# Patient Record
Sex: MALE
[De-identification: names, ages, dates, MRNs, and addresses within clinical notes are randomized per-mention and may not be internally consistent; named-entity substitution may affect disease eponyms.]

---

## 2019-08-06 PROBLEM — Z00.00 ENCOUNTER FOR PREVENTIVE HEALTH EXAMINATION: Status: ACTIVE | Noted: 2019-08-06

## 2019-08-09 ENCOUNTER — APPOINTMENT (OUTPATIENT)
Dept: ORTHOPEDIC SURGERY | Facility: CLINIC | Age: 64
End: 2019-08-09
Payer: COMMERCIAL

## 2019-08-09 VITALS
SYSTOLIC BLOOD PRESSURE: 100 MMHG | HEART RATE: 63 BPM | WEIGHT: 190 LBS | BODY MASS INDEX: 28.14 KG/M2 | OXYGEN SATURATION: 98 % | HEIGHT: 69 IN | DIASTOLIC BLOOD PRESSURE: 60 MMHG

## 2019-08-09 DIAGNOSIS — J44.9 CHRONIC OBSTRUCTIVE PULMONARY DISEASE, UNSPECIFIED: ICD-10-CM

## 2019-08-09 DIAGNOSIS — Z78.9 OTHER SPECIFIED HEALTH STATUS: ICD-10-CM

## 2019-08-09 DIAGNOSIS — Z86.69 PERSONAL HISTORY OF OTHER DISEASES OF THE NERVOUS SYSTEM AND SENSE ORGANS: ICD-10-CM

## 2019-08-09 DIAGNOSIS — M17.11 UNILATERAL PRIMARY OSTEOARTHRITIS, RIGHT KNEE: ICD-10-CM

## 2019-08-09 DIAGNOSIS — I10 ESSENTIAL (PRIMARY) HYPERTENSION: ICD-10-CM

## 2019-08-09 PROCEDURE — 99203 OFFICE O/P NEW LOW 30 MIN: CPT

## 2019-08-09 RX ORDER — LOSARTAN POTASSIUM 100 MG/1
TABLET, FILM COATED ORAL
Refills: 0 | Status: ACTIVE | COMMUNITY

## 2019-08-09 RX ORDER — MONTELUKAST SODIUM 10 MG/1
10 TABLET, FILM COATED ORAL
Refills: 0 | Status: ACTIVE | COMMUNITY

## 2019-08-19 NOTE — PROCEDURE
[de-identified] : Under strict sterile technique, the right knee was prepped with Betadine. Using the superolateral approach, with the patient supine, 1ml of Kenalog was mixed with 5mls of 1% Lidocaine and 5mLs of 0.5% Marcaine, and was injected intraarticularly. The patient tolerated the procedure well. The patient was instructed to avoid vigorous exercise for 24 hours and will apply ice to the knee for 20 minutes 2-3 times per day if discomfort occurs. Patient will return on an as needed basis. The patient will call if any questions or problems should arise

## 2019-08-19 NOTE — HISTORY OF PRESENT ILLNESS
[de-identified] : Mr Mudigonda is a 65 yo gentleman with right knee issues since Monday. he was carrying his bike down a flight of stairs and felt a click in the back on his knee. He could not bear weight. His knee swelled. he has been icing and taking advil with some improvement. He continues to use a cane for support. His knee feels stiff and weak. he denies any previous knee issues. He was sent for an MRI and presents now for further evaluation.

## 2019-08-19 NOTE — END OF VISIT
[FreeTextEntry3] : \par All medical record entries made by CARYL Roca, acting as a scribe for this encounter under the direction of Romulo Andersen MD . I have reviewed the chart and agree that the record accurately reflects my personal performance of the history, physical exam, assessment and plan. I have also personally directed, reviewed, and agreed with the chart. \par \par

## 2019-08-19 NOTE — PHYSICAL EXAM
[de-identified] : The patient is a well developed, well nourished male in no apparent distress. He is alert and oriented X 3 with a pleasant mood and appropriate affect. \par \par On physical examination of the right knee, his ROM is 2-.110 degrees The patient walks with an antalgic gait and stands in neutral alignment. There is 1+effusion. No warmth or erythema is noted. The patella is non tender to palpation medially or laterally. There is no crepitus noted. The apprehension and grind tests are negative. The extensor mechanism is intact. There is medial joint line tenderness. The Antoinette sign is positive. The Lachman and pivot shift tests are negative. There is no varus or valgus laxity at 0 or 30 degrees. No posterolateral or anteromedial laxity is noted. No masses are palpable. No other soft tissue or bony tenderness is noted. Quadriceps weakness is noted. Neurovascular function is intact.   [de-identified] : MRi of the right knee shows a radial tear at the posterior root attachment of the medial mensicus

## 2019-08-19 NOTE — DISCUSSION/SUMMARY
[de-identified] : Mr Mudigonda has a root tear of the medial mensicus. He will begin a course of supervised PT to work on his strength and ROM. He will continue to advance his activities as tolerated. We will see him back on an as needed basis. He will call if any issues arise.